# Patient Record
(demographics unavailable — no encounter records)

---

## 2024-11-12 NOTE — ASSESSMENT
[FreeTextEntry1] : check blood renew meds for sleep flu vaccine  give  2/4/20 renew tamazepam seeing cards doing well check labs  3/30/21 pt doing well check labs renew tamezapem very alert and fit no chest pain  5/18/21 pt diagnosed with a fib now on eliquis stopped celebrex has stopped tamsulosin BP running high starting terazasin will call cardiologist for records asked pt to return in three weeks bring all meds with him to reconcile  6/8/21 pt with hypertension and a fib presently doing well all meds reconciled temazapam renewed  10/21/21 check labs renew all meds afib and htn under control  2/22/22 pt is doing well rechecking labs renewed meds feeling well being careful with covid  8/30/22 is well will check labs again today all vvs  no changes made  1/17/23 will try trazodone instead for sleep had flu vaccines had covid vaccines willl check labs next time cont same meds  6/20/23 stopped trazodone back on temazepam had labs done at [facility] 4/18/23 will obtain records referred to urology  11/12/24 ordering labs today cutting temazepam in 1/2 cont meds - renewed had flu vaccine needs covid booster

## 2024-11-12 NOTE — HISTORY OF PRESENT ILLNESS
[NO] : No [0] : 2) Feeling down, depressed, or hopeless: Not at all (0) [PHQ-2 Negative - No further assessment needed] : PHQ-2 Negative - No further assessment needed [I have developed a follow-up plan documented below in the note.] : I have developed a follow-up plan documented below in the note. [FreeTextEntry1] : pt has been well saw cardiologist - Dr Mcarthur same meds no chest pain no shortness of breath   1/17/23 pt has been well concerned re temazepam - only takes one - but feels perhaps  it is affecting memory drives well occas forgets a name cannot sleep without meds  6/20/23 trazodone no help stopped asks for temazepam feels v well had labs at [facility] went to Dr Kathi doe - told all good going [facility] goes to eye dr - told him no more glaucoma drives does all his paper work c/o less memory needs urolgist  11/21/23 pt saw Dr Quintero he had chest xray - minimal effusion, cardiomegaly had labs there he forgot to bring them needs flu vaccine ext time would like psa check  has all labs  has mucous in throat  5/21/24 feels rt shoulder weaker and sore concerned it is his heart yesterday went to eye   11/12/24 shoulder a little better went to cards - ok had flu vaccine sleeps well - wants to reduce temazepam to 7.5 mg  needs covid vaccine

## 2024-11-12 NOTE — REVIEW OF SYSTEMS
[Eyesight Problems] : eyesight problems [Arthralgias] : arthralgias [Joint Stiffness] : joint stiffness [Sleep Disturbances] : sleep disturbances [Negative] : Heme/Lymph [Chest Pain] : no chest pain [Palpitations] : no palpitations [FreeTextEntry9] : rt shoulder pain

## 2024-11-12 NOTE — PHYSICAL EXAM
[Alert] : alert [Well Nourished] : well nourished [Healthy Appearing] : healthy appearing [Well Developed] : well developed [Normal Voice/Communication] : normal voice/communication [Sclera] : the sclera and conjunctiva were normal [EOMI] : extraocular movements were intact [Normal Outer Ear/Nose] : the ears and nose were normal in appearance [Normal Appearance] : the appearance of the neck was normal [No Neck Mass] : no neck mass was observed [Supple] : the neck was supple [No Respiratory Distress] : no respiratory distress [No Acc Muscle Use] : no accessory muscle use [Respiration, Rhythm And Depth] : normal respiratory rhythm and effort [Auscultation Breath Sounds / Voice Sounds] : lungs were clear to auscultation bilaterally [Normal S1, S2] : normal S1 and S2 [Abdomen Tenderness] : non-tender [No Masses] : no abdominal mass palpated [Abdomen Soft] : soft [Normal] : no focal deficits [de-identified] : rasheed martinez irreg [de-identified] : trace-  +1  LE edema [de-identified] : keratoses

## 2025-03-25 NOTE — PHYSICAL EXAM
[Alert] : alert [Well Nourished] : well nourished [Healthy Appearing] : healthy appearing [Well Developed] : well developed [Normal Voice/Communication] : normal voice/communication [Sclera] : the sclera and conjunctiva were normal [EOMI] : extraocular movements were intact [Normal Outer Ear/Nose] : the ears and nose were normal in appearance [Normal Appearance] : the appearance of the neck was normal [No Neck Mass] : no neck mass was observed [Supple] : the neck was supple [No Respiratory Distress] : no respiratory distress [No Acc Muscle Use] : no accessory muscle use [Respiration, Rhythm And Depth] : normal respiratory rhythm and effort [Auscultation Breath Sounds / Voice Sounds] : lungs were clear to auscultation bilaterally [Normal S1, S2] : normal S1 and S2 [Abdomen Tenderness] : non-tender [No Masses] : no abdominal mass palpated [Abdomen Soft] : soft [Normal] : normal affect and normal mood [de-identified] : rasheed martinez irreg [de-identified] : trace-  +1  LE edema [de-identified] : keratoses

## 2025-03-25 NOTE — ASSESSMENT
[FreeTextEntry1] : check blood renew meds for sleep flu vaccine  give  2/4/20 renew tamazepam seeing cards doing well check labs  3/30/21 pt doing well check labs renew tamezapem very alert and fit no chest pain  5/18/21 pt diagnosed with a fib now on eliquis stopped celebrex has stopped tamsulosin BP running high starting terazasin will call cardiologist for records asked pt to return in three weeks bring all meds with him to reconcile  6/8/21 pt with hypertension and a fib presently doing well all meds reconciled temazapam renewed  10/21/21 check labs renew all meds afib and htn under control  2/22/22 pt is doing well rechecking labs renewed meds feeling well being careful with covid  8/30/22 is well will check labs again today all vvs  no changes made  1/17/23 will try trazodone instead for sleep had flu vaccines had covid vaccines willl check labs next time cont same meds  6/20/23 stopped trazodone back on temazepam had labs done at [facility] 4/18/23 will obtain records referred to urology  11/12/24 ordering labs today cutting temazepam in 1/2 cont meds - renewed had flu vaccine needs covid booster  3/25/25 pt had check up and labs with cardiologist Dr Monroe he is feeling very well no changes made

## 2025-03-25 NOTE — HISTORY OF PRESENT ILLNESS
[FreeTextEntry1] : pt has been well saw cardiologist - Dr Mcarthur same meds no chest pain no shortness of breath   1/17/23 pt has been well concerned re temazepam - only takes one - but feels perhaps  it is affecting memory drives well occas forgets a name cannot sleep without meds  6/20/23 trazodone no help stopped asks for temazepam feels v well had labs at [facility] went to Dr Kathi doe - told all good going to [facility] goes to eye dr - told him no more glaucoma drives does all his paper work c/o less memory needs urolgist  11/21/23 pt saw Dr Quintero he had chest xray - minimal effusion, cardiomegaly had labs there he forgot to bring them needs flu vaccine ext time would like psa check  has all labs  has mucous in throat  5/21/24 feels rt shoulder weaker and sore concerned it is his heart yesterday went to eye   11/12/24 shoulder a little better went to [facility] - ok had flu vaccine sleeps well - wants to reduce temazepam to 7.5 mg  needs covid vaccine  3/25/25 pt has been well  [No falls in past year] : Patient reported no falls in the past year [Patient is independent with] : bathing [] : managing medications [Independent] : managing finances [Driving Concerns] : not driving or driving without noted concerns [de-identified] : able tp drive

## 2025-07-22 NOTE — ASSESSMENT
[FreeTextEntry1] : check blood renew meds for sleep flu vaccine  give  2/4/20 renew tamazepam seeing cards doing well check labs  3/30/21 pt doing well check labs renew tamezapem very alert and fit no chest pain  5/18/21 pt diagnosed with a fib now on eliquis stopped celebrex has stopped tamsulosin BP running high starting terazasin will call cardiologist for records asked pt to return in three weeks bring all meds with him to reconcile  6/8/21 pt with hypertension and a fib presently doing well all meds reconciled temazapam renewed  10/21/21 check labs renew all meds afib and htn under control  2/22/22 pt is doing well rechecking labs renewed meds feeling well being careful with covid  8/30/22 is well will check labs again today all vvs  no changes made  1/17/23 will try trazodone instead for sleep had flu vaccines had covid vaccines willl check labs next time cont same meds  6/20/23 stopped trazodone back on temazepam had labs done at [facility] 4/18/23 will obtain records referred to urology  11/12/24 ordering labs today cutting temazepam in 1/2 cont meds - renewed had flu vaccine needs covid booster  3/25/25 pt had check up and labs with cardiologist Dr Monroe he is feeling very well no changes made  7/22/25 pt is doing well will check labs cont current meds will do hoe visit

## 2025-07-22 NOTE — PHYSICAL EXAM
[Alert] : alert [Well Nourished] : well nourished [Healthy Appearing] : healthy appearing [Well Developed] : well developed [Normal Voice/Communication] : normal voice/communication [Sclera] : the sclera and conjunctiva were normal [EOMI] : extraocular movements were intact [Normal Outer Ear/Nose] : the ears and nose were normal in appearance [Normal Appearance] : the appearance of the neck was normal [No Neck Mass] : no neck mass was observed [Supple] : the neck was supple [No Respiratory Distress] : no respiratory distress [No Acc Muscle Use] : no accessory muscle use [Respiration, Rhythm And Depth] : normal respiratory rhythm and effort [Auscultation Breath Sounds / Voice Sounds] : lungs were clear to auscultation bilaterally [Normal S1, S2] : normal S1 and S2 [Abdomen Tenderness] : non-tender [No Masses] : no abdominal mass palpated [Abdomen Soft] : soft [Normal] : normal affect and normal mood [de-identified] : rasheed martinez irreg [de-identified] : trace-  +1  LE edema [de-identified] : keratoses

## 2025-07-22 NOTE — REVIEW OF SYSTEMS
[Feeling Tired] : feeling tired [Recent Weight Loss (___ Lbs)] : recent [unfilled] ~Ulb weight loss [Eyesight Problems] : eyesight problems [SOB on Exertion] : shortness of breath during exertion [Negative] : Endocrine [FreeTextEntry6] : on stairs

## 2025-07-22 NOTE — HISTORY OF PRESENT ILLNESS
[FreeTextEntry1] : pt has been well saw cardiologist - Dr Mcarthur same meds no chest pain no shortness of breath   1/17/23 pt has been well concerned re temazepam - only takes one - but feels perhaps  it is affecting memory drives well occas forgets a name cannot sleep without meds  6/20/23 trazodone no help stopped asks for temazepam feels v well had labs at [facility] went to Dr Kathi doe - told all good going to [facility] goes to eye dr - told him no more glaucoma drives does all his paper work c/o less memory needs urolgist  11/21/23 pt saw Dr Quintero he had chest xray - minimal effusion, cardiomegaly had labs there he forgot to bring them needs flu vaccine ext time would like psa check  has all labs  has mucous in throat  5/21/24 feels rt shoulder weaker and sore concerned it is his heart yesterday went to eye   11/12/24 shoulder a little better went to [facility] - ok had flu vaccine sleeps well - wants to reduce temazepam to 7.5 mg  needs covid vaccine  3/25/25 pt has been well  7/22/25 pt has been well lost a bit of weight all same meds to see cardiologist tomorrow